# Patient Record
(demographics unavailable — no encounter records)

---

## 2024-10-16 NOTE — HISTORY OF PRESENT ILLNESS
[de-identified] : NAMITA BLEVINS 57 year female Follow-up for acute medial meniscus tear of right knee. Pt states that he has been doing Physical Therapy for 6 weeks now twice a week which was going great until a week ago, when he started to experience pain after some intense lunge activity

## 2024-10-16 NOTE — DISCUSSION/SUMMARY
[de-identified] : She had been doing so well in PT they added lunges with bands and pivot and now has one week of discomfort .  We have discussed possible partial medial menisectomy at age 57 with degenerative tear mid body to posterior horn. However it is now close to ski season an important time for her family and the pain is only one week after 2 months pain free.  We will attempt one week Aleve and avoid lunges and see if she becomes asymptomatic. If not move to arthroscopic partial menisectomy.  This could be done before or after ski season.  She will call in 1 week if she wants to proceed Nov 15 otherwise I will see her in December.  We discussed the risks and benefits of Arthroscopic menisectomy and post op course fully today. All questions answered and she will consider.

## 2024-12-04 NOTE — HISTORY OF PRESENT ILLNESS
[de-identified] : NAMITA BLEVINS 57 year female Follow-up for acute medial meniscus tear of right knee.pt states her right knee is still bothering her and she is considering getting surgery, but she wants to get through ski season first.  With rest after the lunge incident pain went away however once she attempted any hi impact activity medial knee ache returned. Still no mechanical symptoms.

## 2024-12-04 NOTE — DISCUSSION/SUMMARY
[de-identified] : right knee with complex radial tear medial meniscus no arthritis or signficant chondral defects.  SHe would like to complete ski season but ultimately attempt at medial meniscus repair vs partial menisectomy will be needed for this very athletic female.  At age 56 we spoke of the success and failure rates and increased downtime associated with repair and the risks and benefits of partial meniscectomy.  She will follow in late February to consider March April surgical date.